# Patient Record
Sex: FEMALE | URBAN - METROPOLITAN AREA
[De-identification: names, ages, dates, MRNs, and addresses within clinical notes are randomized per-mention and may not be internally consistent; named-entity substitution may affect disease eponyms.]

---

## 2022-09-28 NOTE — IMPRESSION/PLAN
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 Plan: no DME on mac OCT. Continue strict BG control. F/u with PCP as directed. Call with vision changes.

## 2022-09-28 NOTE — IMPRESSION/PLAN
Impression: Macular hole of right eye: H35.341. Plan: longstanding. Discussed vision limitations. Retina consult after phaco - guarded prognosis for visual recovery given longstanding symptoms.

## 2022-09-28 NOTE — IMPRESSION/PLAN
Impression: Combined forms of age-related cataract, bilateral: H25.813. Plan: visually significant with expected improvement in vision with phaco/IOL. Discussed r/b/a of procedure. All surgical options discussed, including LensX vs conventional, and multifocal IOLs. Patient accepts full time glasses after surgery if conventional is elected, and glasses for NEAR if LensX/toric is elected. NO MFIOL D/T RETINA. All questions answered. Refer patient for cataract pre-op. First Eye: OD Target: DISTANCE Dilation: GOOD Habitual spec wear: BIFOCAL 

(-) h/o trauma (-) h/o tamsulosin

## 2023-01-12 NOTE — IMPRESSION/PLAN
Impression: Other sequelae of cerebral infarction: I69.398. Plan: Hx Stroke x3. Discussed with patient, understands this may limit vision after surgery. 
TEST: 
1/12/23 Visual Field - OD: Poor-dense inf depression, linda/sup depression; OS: Poor-global loss

## 2023-01-12 NOTE — IMPRESSION/PLAN
Impression: Combined forms of age-related cataract, bilateral: H25.813. Plan: PLAN: (( AIM -0.25 OU: TRIMOXI OU plus drops ou (mac hole od/ bdr ou ), NO ORA OU, NO LRI OU: Declined AMP, DENSE, Needs Retina consult prior  , NOTE LENS TYPE: [id number]  . 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 Appropriate testing ordered for cataract diagnosis prior to Preop. Risks and benefits of surgical treatment were discussed and understood. Patient desires surgical treatment. Premium options discussed but patient declines and is ok with using glasses after surgery. Patient desires to proceed with surgery OU, OD FIRST. Both eyes examined, medically necessary due to impact in activities of daily living. Discussed with pt the need for glasses after surgery. Discussed there is a chance of developing capsular haze after surgery, which may be corrected with laser/yag after surgery. Understands higher risk of complication and delayed healing due to dense cataract. Discussed higher risk of snuff out/ blindness.  present. Daughter present. It has been explained to patient that patient must carefully weigh the risks vs. benefits of having surgery on the better seeing eye.

## 2023-01-12 NOTE — IMPRESSION/PLAN
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 Plan: Discussed diet, exercise, nutrition. Good blood sugar and blood pressure control. Maintain follow up with PCP. Discussed with patient, understands this may limit vision after surgery.

## 2023-01-12 NOTE — IMPRESSION/PLAN
Impression: Macular hole of right eye: H35.341. Plan: Discussed signs and symptoms of retinal detachment (flashes,floaters, curtain) as precaution. Patient instructed to call or return to clinic if condition gets worse. Discussed with patient, understands this may limit vision after surgery. Discussed monocular precautions with patient. Advised pt to wear protective eyewear.

## 2023-01-17 NOTE — IMPRESSION/PLAN
Impression: Combined forms of age-related cataract, bilateral: H25.813. Plan: visually significant cataracts. ok to proceed with cataract surgery RTC Cataract surgery

## 2023-01-17 NOTE — IMPRESSION/PLAN
Impression: Macular hole of right eye: H35.341. Plan: hx of CVA however this is no likely a cause for unilateral vision loss and VFs were not reliable and should be repeated several times to obtain accurate picture. macular hole does not appear to be chronic, less than 1 year. is amenable to surgery should patient decide to proceed, though not initially recommended due to fragility of health. RTC PRN retina

## 2023-01-20 NOTE — IMPRESSION/PLAN
Impression: S/P Cataract Extraction by phacoemulsification with IOL placement OD - 1 Day. Encounter for surgical aftercare following surgery on a sense organ  Z48.810. Excellent post op course Plan: good IOP. Restrictions discussed. Call with worsening pain or vision.

## 2023-01-26 NOTE — IMPRESSION/PLAN
Impression: S/P Cataract Extraction by phacoemulsification with IOL placement OD - 7 Days. Encounter for surgical aftercare following surgery on a sense organ  Z48.810. Excellent post op course   Post operative instructions reviewed - Plan: good IOP, no infection. Restrictions lifted. Call with worsening pain or vision. Start Flarex TID with a weekly taper. OD only. Return as scheduled. OK to proceed with phaco second eye.

## 2023-02-02 ENCOUNTER — SURGERY (OUTPATIENT)
Dept: URBAN - METROPOLITAN AREA SURGERY 19 | Facility: SURGERY | Age: 68
End: 2023-02-02
Payer: MEDICARE

## 2023-02-02 DIAGNOSIS — H25.812 COMBINED FORMS OF AGE-RELATED CATARACT, LEFT EYE: Primary | ICD-10-CM

## 2023-02-02 PROCEDURE — 66984 XCAPSL CTRC RMVL W/O ECP: CPT | Performed by: OPHTHALMOLOGY

## 2023-02-03 ENCOUNTER — POST-OPERATIVE VISIT (OUTPATIENT)
Dept: URBAN - METROPOLITAN AREA CLINIC 56 | Facility: LOCATION | Age: 68
End: 2023-02-03
Payer: MEDICARE

## 2023-02-03 DIAGNOSIS — Z96.1 PRESENCE OF INTRAOCULAR LENS: Primary | ICD-10-CM

## 2023-02-03 PROCEDURE — 99024 POSTOP FOLLOW-UP VISIT: CPT | Performed by: STUDENT IN AN ORGANIZED HEALTH CARE EDUCATION/TRAINING PROGRAM

## 2023-02-03 ASSESSMENT — INTRAOCULAR PRESSURE: OS: 14

## 2023-02-24 ENCOUNTER — POST-OPERATIVE VISIT (OUTPATIENT)
Dept: URBAN - METROPOLITAN AREA CLINIC 56 | Facility: LOCATION | Age: 68
End: 2023-02-24
Payer: MEDICARE

## 2023-02-24 DIAGNOSIS — Z96.1 PRESENCE OF INTRAOCULAR LENS: Primary | ICD-10-CM

## 2023-02-24 PROCEDURE — 99024 POSTOP FOLLOW-UP VISIT: CPT | Performed by: STUDENT IN AN ORGANIZED HEALTH CARE EDUCATION/TRAINING PROGRAM

## 2023-02-24 ASSESSMENT — INTRAOCULAR PRESSURE
OD: 14
OS: 14

## 2023-02-24 ASSESSMENT — VISUAL ACUITY
OS: 20/20
OD: CF 3FT

## 2023-02-24 NOTE — IMPRESSION/PLAN
Impression: S/P Cataract Extraction by phacoemulsification with IOL placement OS - 22 Days. Presence of intraocular lens  Z96.1. Excellent post op course   Post operative instructions reviewed - Plan: good IOP, no infection. Restrictions lifted. Call with worsening pain or vision. Recommend OTC readers.  

RTC 1 year for CE